# Patient Record
(demographics unavailable — no encounter records)

---

## 2025-02-16 NOTE — HISTORY OF PRESENT ILLNESS
[FreeTextEntry1] : 76 M AF HTN Dyslipidemia HFpEF (remote hosp for pulmonary edema) DM Sleep apnea (no cpap) Poor health literacy. Aortic stenosis Diverticulosis HASH Hepatitis B Hypothyroid Iron def anemia Venous insufficiency OA B12 def PAD Loop recorder   initially self referred to establish care he is sob uses a cane to ambulate. he is very limited and debilitated from his weight sob.  He is unable to give a detailed medical history    ecg sr 2/14/25 Echo ([facility]) 3/2024 moderate AS LLOYD 1.4 cm2 mean 21 mmHg EF normal  LE arterial duplex occluded right post tibial 1/5/2024  PYP scan 2/14/25

## 2025-02-16 NOTE — ASSESSMENT
[FreeTextEntry1] : - BP acceptable on lisinopril 5 mg daily - AF on eliquis 5 mg bid toprol xl 100 mg daily - DM start mounjaro 2.5 mg weekly would consider him for bariatric surgery continue atorvastatin 20 mg mala y - should be on SGLT2 lets see if he is amenable - repeat echo - fu in one month

## 2025-03-25 NOTE — ASSESSMENT
[FreeTextEntry1] : - BP acceptable on lisinopril 5 mg daily - AF on eliquis 5 mg bid toprol xl 100 mg daily - DM increase mounjaro to 5 mg weekly  - continue farxiga 10 mg daily - fu in three months

## 2025-03-25 NOTE — PHYSICAL EXAM
The patient is a 62y Female complaining of altered mental status. [Well Developed] : well developed [Well Nourished] : well nourished [No Acute Distress] : no acute distress [Normal Conjunctiva] : normal conjunctiva [Normal Venous Pressure] : normal venous pressure [No Carotid Bruit] : no carotid bruit [Normal S1, S2] : normal S1, S2 [No Murmur] : no murmur [No Rub] : no rub [No Gallop] : no gallop [Clear Lung Fields] : clear lung fields [Good Air Entry] : good air entry [No Respiratory Distress] : no respiratory distress  [Soft] : abdomen soft [Non Tender] : non-tender [No Masses/organomegaly] : no masses/organomegaly [Normal Bowel Sounds] : normal bowel sounds [Normal Gait] : normal gait [No Edema] : no edema [No Cyanosis] : no cyanosis [No Clubbing] : no clubbing [No Varicosities] : no varicosities [No Rash] : no rash [No Skin Lesions] : no skin lesions [Moves all extremities] : moves all extremities [No Focal Deficits] : no focal deficits [Normal Speech] : normal speech [Alert and Oriented] : alert and oriented [Normal memory] : normal memory

## 2025-03-25 NOTE — HISTORY OF PRESENT ILLNESS
[FreeTextEntry1] : 76 M AF HTN Dyslipidemia HFpEF (remote hosp for pulmonary edema) DM Sleep apnea (no cpap) Poor health literacy. Aortic stenosis Diverticulosis HASH Hepatitis B Hypothyroid Iron def anemia Venous insufficiency OA B12 def PAD Loop recorder   initially self referred to establish care he is sob uses a cane to ambulate. he is very limited and debilitated from his weight sob.  He is unable to give a detailed medical history   started on farxiga and mounjaro lost weight no side effects    ecg sr  3/25/25  Echo ([facility]) 3/2024 moderate AS LLOYD 1.4 cm2 mean 21 mmHg EF normal  LE arterial duplex occluded right post tibial 1/5/2024  PYP scan 2/14/25

## 2025-05-08 NOTE — HISTORY OF PRESENT ILLNESS
[FreeTextEntry1] : 76 M AF HTN Dyslipidemia HFpEF (remote hosp for pulmonary edema) DM Sleep apnea (no cpap) Poor health literacy. Aortic stenosis Diverticulosis HASH Hepatitis B Hypothyroid Iron def anemia Venous insufficiency OA B12 def PAD Loop recorder   initially self referred to establish care he is sob uses a cane to ambulate. he is very limited and debilitated from his weight sob.  He is unable to give a detailed medical history   plan for dental work    ecg sr   5/8/25 Echo ([facility]) 3/2024 moderate AS LLOYD 1.4 cm2 mean 21 mmHg EF normal  LE arterial duplex occluded right post tibial 1/5/2024  PYP scan 2/14/25

## 2025-05-08 NOTE — ASSESSMENT
Goal Outcome Evaluation:  FOCUS/GOAL  Medical management and Cognition/Memory/Judgment/Problem solving    ASSESSMENT, INTERVENTIONS AND CONTINUING PLAN FOR GOAL:  Patient slept well overnight, this morning is denying any pain and was ready to get up into the chair around 0630.  Patient ordered breakfast independently, no concerns at this time, continue with POC.                         [FreeTextEntry1] : - on losartan 25 mg daily for ckd  - AF on eliquis 5 mg bid toprol xl 100 mg daily - DM increase mounjaro to 7.5 mg weekly  - continue farxiga 10 mg daily - he is optimized from cardiac standpoint for dental work no need for abx prophylaxis would not stop eliquis for dental work he is stable from a cardiac standpoint  - fu in three months